# Patient Record
Sex: FEMALE | Race: WHITE | NOT HISPANIC OR LATINO | Employment: OTHER | URBAN - METROPOLITAN AREA
[De-identification: names, ages, dates, MRNs, and addresses within clinical notes are randomized per-mention and may not be internally consistent; named-entity substitution may affect disease eponyms.]

---

## 2021-05-27 ENCOUNTER — RECORDS - HEALTHEAST (OUTPATIENT)
Dept: ADMINISTRATIVE | Facility: CLINIC | Age: 56
End: 2021-05-27

## 2021-05-28 ENCOUNTER — RECORDS - HEALTHEAST (OUTPATIENT)
Dept: ADMINISTRATIVE | Facility: CLINIC | Age: 56
End: 2021-05-28

## 2021-05-30 ENCOUNTER — RECORDS - HEALTHEAST (OUTPATIENT)
Dept: ADMINISTRATIVE | Facility: CLINIC | Age: 56
End: 2021-05-30

## 2021-06-02 ENCOUNTER — RECORDS - HEALTHEAST (OUTPATIENT)
Dept: ADMINISTRATIVE | Facility: CLINIC | Age: 56
End: 2021-06-02

## 2021-06-26 ENCOUNTER — HEALTH MAINTENANCE LETTER (OUTPATIENT)
Age: 56
End: 2021-06-26

## 2021-10-16 ENCOUNTER — HEALTH MAINTENANCE LETTER (OUTPATIENT)
Age: 56
End: 2021-10-16

## 2022-07-17 ENCOUNTER — HEALTH MAINTENANCE LETTER (OUTPATIENT)
Age: 57
End: 2022-07-17

## 2022-09-25 ENCOUNTER — HEALTH MAINTENANCE LETTER (OUTPATIENT)
Age: 57
End: 2022-09-25

## 2023-08-05 ENCOUNTER — HEALTH MAINTENANCE LETTER (OUTPATIENT)
Age: 58
End: 2023-08-05

## 2024-02-19 ENCOUNTER — APPOINTMENT (OUTPATIENT)
Dept: CT IMAGING | Facility: CLINIC | Age: 59
End: 2024-02-19

## 2024-02-19 ENCOUNTER — APPOINTMENT (OUTPATIENT)
Dept: MRI IMAGING | Facility: CLINIC | Age: 59
End: 2024-02-19

## 2024-02-19 ENCOUNTER — HOSPITAL ENCOUNTER (EMERGENCY)
Facility: CLINIC | Age: 59
Discharge: HOME | End: 2024-02-19
Attending: EMERGENCY MEDICINE | Admitting: EMERGENCY MEDICINE

## 2024-02-19 ENCOUNTER — APPOINTMENT (OUTPATIENT)
Dept: RADIOLOGY | Facility: CLINIC | Age: 59
End: 2024-02-19
Attending: EMERGENCY MEDICINE

## 2024-02-19 ENCOUNTER — APPOINTMENT (OUTPATIENT)
Dept: RADIOLOGY | Facility: CLINIC | Age: 59
End: 2024-02-19

## 2024-02-19 VITALS
DIASTOLIC BLOOD PRESSURE: 99 MMHG | HEIGHT: 60 IN | TEMPERATURE: 98 F | SYSTOLIC BLOOD PRESSURE: 181 MMHG | OXYGEN SATURATION: 96 % | RESPIRATION RATE: 20 BRPM | HEART RATE: 88 BPM | WEIGHT: 130 LBS | BODY MASS INDEX: 25.52 KG/M2

## 2024-02-19 DIAGNOSIS — S42.213A: ICD-10-CM

## 2024-02-19 DIAGNOSIS — I10 UNCONTROLLED HYPERTENSION: ICD-10-CM

## 2024-02-19 LAB
CREAT SERPL-MCNC: 0.65 MG/DL (ref 0.51–0.95)
EGFRCR SERPLBLD CKD-EPI 2021: >90 ML/MIN/1.73M2
HOLD SPECIMEN: NORMAL

## 2024-02-19 PROCEDURE — 250N000013 HC RX MED GY IP 250 OP 250 PS 637

## 2024-02-19 PROCEDURE — 73060 X-RAY EXAM OF HUMERUS: CPT | Mod: LT

## 2024-02-19 PROCEDURE — 70450 CT HEAD/BRAIN W/O DYE: CPT

## 2024-02-19 PROCEDURE — 255N000002 HC RX 255 OP 636: Performed by: EMERGENCY MEDICINE

## 2024-02-19 PROCEDURE — A9585 GADOBUTROL INJECTION: HCPCS | Performed by: EMERGENCY MEDICINE

## 2024-02-19 PROCEDURE — 36415 COLL VENOUS BLD VENIPUNCTURE: CPT

## 2024-02-19 PROCEDURE — 73070 X-RAY EXAM OF ELBOW: CPT | Mod: LT

## 2024-02-19 PROCEDURE — 82565 ASSAY OF CREATININE: CPT

## 2024-02-19 PROCEDURE — 99285 EMERGENCY DEPT VISIT HI MDM: CPT | Mod: 25

## 2024-02-19 PROCEDURE — 70553 MRI BRAIN STEM W/O & W/DYE: CPT

## 2024-02-19 RX ORDER — ACETAMINOPHEN 325 MG/1
650 TABLET ORAL ONCE
Status: COMPLETED | OUTPATIENT
Start: 2024-02-19 | End: 2024-02-19

## 2024-02-19 RX ORDER — OXYCODONE HYDROCHLORIDE 5 MG/1
5 TABLET ORAL EVERY 6 HOURS PRN
Qty: 11 TABLET | Refills: 0 | Status: SHIPPED | OUTPATIENT
Start: 2024-02-19 | End: 2024-02-22

## 2024-02-19 RX ORDER — GADOBUTROL 604.72 MG/ML
0.1 INJECTION INTRAVENOUS ONCE
Status: COMPLETED | OUTPATIENT
Start: 2024-02-19 | End: 2024-02-19

## 2024-02-19 RX ADMIN — GADOBUTROL 5.9 ML: 604.72 INJECTION INTRAVENOUS at 20:49

## 2024-02-19 RX ADMIN — ACETAMINOPHEN 650 MG: 325 TABLET ORAL at 20:06

## 2024-02-19 ASSESSMENT — ACTIVITIES OF DAILY LIVING (ADL)
ADLS_ACUITY_SCORE: 35
ADLS_ACUITY_SCORE: 35

## 2024-02-19 NOTE — ED PROVIDER NOTES
EMERGENCY DEPARTMENT ENCOUNTER      NAME: Aurelia Mora  AGE: 59 year old female  YOB: 1965  MRN: [id number]  EVALUATION DATE & TIME: No admission date for patient encounter.    PCP: No primary care provider on file.    ED PROVIDER: Oneyda Acosta PA-C      Chief Complaint   Patient presents with    Arm Pain    Arm Injury     FINAL IMPRESSION:  1. Closed displaced fracture of surgical neck of humerus, unspecified fracture morphology, unspecified laterality, initial encounter    2. Uncontrolled hypertension      ED COURSE & MEDICAL DECISION MAKING:    Pertinent Labs & Imaging studies reviewed. (See chart for details)  59 year old female presents to the Emergency Department for evaluation of fall.  Just prior to arrival patient tripped over a cat bed and fell onto her left side.  Patient reports she did hit her head but did not lose consciousness.  No blood thinners on board.  Patient complains of left upper arm pain.  She denies headache, dizziness, lightheadedness, chest pain, shortness of breath, nausea, vomiting, sensory changes.  Vital signs reviewed patient is hypertensive most likely secondary to pain. Patient does have uncontrolled HTN.  Afebrile.  On exam cranial nerves II through XII intact.  GCS is 15.  Left upper mid arm is tender to palpation.  Left shoulder and remainder of the left upper extremity is nontender to palpation.  Pulses are 2+ bilaterally.  Decreased range of motion of the left upper arm secondary due to pain.  Normal sensation and strength of the left upper extremity.  Remainder of extremities are nontender with normal range of motion, sensation and strength.    Differential diagnosis includes intracranial hemorrhage, subdural hematoma, epidural hematoma, concussion, humerus fracture, muscle skeletal pain, dislocation.  X-ray of the humerus shows an acute mildly impacted and medial displaced fracture of the proximal humerus metaphysis involving the surgical neck.  Elbow  joint space appears normally aligned.  No definitive elbow joint effusion.  I spoke with Dr. Greenwood from [facility] who recommended placing the patient in a sling.  Dr. Greenwood will see the patient in clinic this week.  Patient received Tylenol for pain.  Patient reports that her pain is well-managed currently.  Patient is resting comfortably.  Head CT showed no acute traumatic intracranial abnormality.  No skull base fractures or clavicular.  Presumed chronic small vessel ischemic changes in the cerebral white matter.  Asymmetric 6 mm focus of hypodensity of the right thalamus representing age-indeterminate ischemia.  MRI was recommended.  MRI of the head shows no acute intercranial process.  Moderate to markedly extensive signal changes in the cerebral white matter and mild patchy signal changes in the kevin, nonspecific, but likely due to chronic small vessel ischemic disease.  Small chronic infarct in the right anterior thalamus.  Multiple nonspecific microhemorrhages presumably chronic primarily seen in the bilateral thalami with a few lesions also noted in the left cerebellar hemisphere and by lateral frontal lobes.  Predominantly pattern suggestive sequel of hypertension microangiopathy.  I reviewed the results with the patient and the patient's family.  Patient is educated on her imaging results.  Patient will follow-up with neurology and primary care doctor to discuss her MRI results and blood pressure.  Patient will follow-up with Dr. Greenwood from [facility] to discuss her fracture.  Patient will stay in her sling.  Patient will rest and ice left arm.  Strict return precautions were provided to the patient.  Patient will return to the ED if new symptoms develop or symptoms worsen.  Patient agrees with plan.  All questions answered.  Patient was discharged home with oxycodone.  Patient was educated on oxycodone and how to properly take it.  All questions answered.  Patient agrees with  plan.      ED COURSE:   5:15 PM I saw the patient.   6:53 PM I spoke with Herman Castillo   6:55 PM I staffed the patient with Dr. Resendiz   7:27 PM I updated the patient on the plan and she is agreeable   9:36 PM Rechecked and updated the patient. We discussed the plan for discharge and the patient is agreeable. Reviewed supportive cares, symptomatic treatment, outpatient follow up, and reasons to return to the Emergency Department. Patient to be discharged by ED RN.          At the conclusion of the encounter I discussed the results of all of the tests and the disposition. The questions were answered. The patient or family acknowledged understanding and was agreeable with the care plan.     0 minutes of critical care time       Medical Decision Making  Obtained supplemental history:Supplemental history obtained?: No  Reviewed external records: External records reviewed?: No  Care impacted by chronic illness:N/A  Care significantly affected by social determinants of health:N/A  Did you consider but not order tests?: Work up considered but not performed and documented in chart, if applicable  Did you interpret images independently?: Independent interpretation of ECG and images noted in documentation, when applicable.  Consultation discussion with other provider:Did you involve another provider (consultant, MH, pharmacy, etc.)?: I discussed the care with another health care provider, see documentation for details.  Discharge. I prescribed additional prescription strength medication(s) as charted. See documentation for any additional details.      MEDICATIONS GIVEN IN THE EMERGENCY:  Medications   acetaminophen (TYLENOL) tablet 650 mg (650 mg Oral $Given 2/19/24 2006)   gadobutrol (GADAVIST) injection 5.9 mL (5.9 mLs Intravenous $Given 2/19/24 2049)       NEW PRESCRIPTIONS STARTED AT TODAY'S ER VISIT  New Prescriptions    OXYCODONE (ROXICODONE) 5 MG TABLET    Take 1 tablet (5 mg) by mouth every 6 hours as  needed for pain          =================================================================    [facility]    Patient information was obtained from: Patient     Use of : N/A         Aurelia Mora is a 59 year old female with no pertinent history on file who presents to this ED by walk in for evaluation of a fall    Patient reports that she tripped and fall on a cat house PTA. She denies lightheadedness, dizziness, chest pain, or any symptoms prior to the fall. She is unsure exactly how she landed but she says she hit her head and immediatly started having left arm pain. She denies LOC. She needed assistance getting off the floor. Her arm is in a home made sling and she hasn't taken anything for the pain. She denies chest pain, shortness of breath, vision changes, nausea, vomiting. She is not anticoagulated.     REVIEW OF SYSTEMS   As per HPI    PAST MEDICAL HISTORY:  No past medical history on file.    PAST SURGICAL HISTORY:  No past surgical history on file.        CURRENT MEDICATIONS:    oxyCODONE (ROXICODONE) 5 MG tablet        ALLERGIES:  No Known Allergies    FAMILY HISTORY:  No family history on file.    SOCIAL HISTORY:   Social History     Socioeconomic History    Marital status: Single       VITALS:  BP (!) 181/99   Pulse 88   Temp 98  F (36.7  C) (Temporal)   Resp 20   Ht 1.524 m (5')   Wt 59 kg (130 lb)   SpO2 96%   BMI 25.39 kg/m      PHYSICAL EXAM    Physical Exam  Vitals and nursing note reviewed.   Constitutional:       Appearance: Normal appearance.   HENT:      Head: Atraumatic.      Right Ear: External ear normal.      Left Ear: External ear normal.      Nose: Nose normal.      Mouth/Throat:      Mouth: Mucous membranes are moist.   Eyes:      Conjunctiva/sclera: Conjunctivae normal.      Pupils: Pupils are equal, round, and reactive to light.   Cardiovascular:      Rate and Rhythm: Normal rate and regular rhythm.      Pulses: Normal pulses.      Heart sounds: Normal heart sounds. No  murmur heard.     No friction rub. No gallop.   Pulmonary:      Effort: Pulmonary effort is normal.      Breath sounds: Normal breath sounds. No wheezing or rales.   Abdominal:      Tenderness: There is no abdominal tenderness. There is no guarding or rebound.   Musculoskeletal:      Cervical back: Normal range of motion.      Comments: Scalp is nontender to palpation.  Facial bones are nontender to palpation.  Cervical, thoracic, lumbar, sacral spinous processes and paraspinal muscles are nontender to palpation.  Left humerus is tender to palpation.  Remainder of extremities are nontender to time.  Decreased range of motion of the left upper extremity secondary due to pain.  Left upper extremity has normal sensation and strength.  Pulses are 2+ bilaterally.  Remainder of normal range of motion, sensation and strength.   Skin:     General: Skin is dry.   Neurological:      General: No focal deficit present.      Mental Status: She is alert and oriented to person, place, and time. Mental status is at baseline.      GCS: GCS eye subscore is 4. GCS verbal subscore is 5. GCS motor subscore is 6.      Cranial Nerves: Cranial nerves 2-12 are intact. No cranial nerve deficit.      Sensory: Sensation is intact.      Motor: Motor function is intact.      Coordination: Coordination is intact.   Psychiatric:         Mood and Affect: Mood normal.         Thought Content: Thought content normal.          LAB:  All pertinent labs reviewed and interpreted.  Labs Ordered and Resulted from Time of ED Arrival to Time of ED Departure   CREATININE - Normal       Result Value    Creatinine 0.65      GFR Estimate >90          RADIOLOGY:  Reviewed all pertinent imaging. Please see official radiology report.  MR Brain w/o & w Contrast   Final Result   IMPRESSION:   1.  No acute intracranial process is identified.   2.  Moderate to markedly extensive signal changes in the cerebral white matter and mild patchy signal changes at the kevin,  nonspecific, but likely due to chronic small vessel ischemic disease.   3.  Small chronic infarct in the right anterior thalamus.   4.  Multiple nonspecific microhemorrhages (presumably chronic) primarily seen in the bilateral thalami with a few lesions also noted in the left cerebellar hemisphere and bilateral frontal lobes. The etiology is unclear, but the predominant pattern    suggests sequela of hypertensive microangiopathy.      XR Elbow Left 2 Views   Final Result   IMPRESSION: Acute mildly impacted and medially displaced fracture of the proximal humeral metaphysis involving the surgical neck. Elbow joint space appears normally aligned. No definite elbow joint effusion, however, evaluation is degraded due to    suboptimal positioning on the lateral view.      NOTE: ABNORMAL REPORT      THE DICTATION ABOVE DESCRIBES AN ABNORMALITY FOR WHICH FOLLOW-UP IS NEEDED.       XR Humerus Left G/E 2 Views   Final Result   IMPRESSION: Acute mildly impacted and medially displaced fracture of the proximal humeral metaphysis involving the surgical neck. Elbow joint space appears normally aligned. No definite elbow joint effusion, however, evaluation is degraded due to    suboptimal positioning on the lateral view.      NOTE: ABNORMAL REPORT      THE DICTATION ABOVE DESCRIBES AN ABNORMALITY FOR WHICH FOLLOW-UP IS NEEDED.       Head CT w/o contrast   Final Result   IMPRESSION:   1.  No acute traumatic intracranial abnormality.   2.  No calvarial or skull base fracture. Severe presumed chronic small vessel ischemic changes in the cerebral white matter.   3.  Asymmetric 6 mm focus of hypodensity at the right thalamus represents age-indeterminate ischemia. This could be further evaluated with MRI.               I, David Hinojosa, am serving as a scribe to document services personally performed by Oneyda Acosta PA-C, based on my observation and the provider's statements to me. I, Oneyda Acosta PA-C, attest that David  Micaela is acting in a scribe capacity, has observed my performance of the services and has documented them in accordance with my direction.    Oneyda Acosta PA-C  [facility] EMERGENCY ROOM  [address] [address] [phone]       Oneyda Acosta PA-C  02/19/24 4452

## 2024-02-19 NOTE — Clinical Note
Aurelia Mora was seen and treated in our emergency department on 2/19/2024.  She may return to work on 02/21/2024.       If you have any questions or concerns, please don't hesitate to call.      Oneyda Acosta, DULCE

## 2024-02-19 NOTE — ED TRIAGE NOTES
"Pt tripped over \"cat house\" at home landing on left arm and now has pain in her left humerus area. CSM intact.      Triage Assessment (Adult)       Row Name 02/19/24 4168          Triage Assessment    Airway WDL WDL        Respiratory WDL    Respiratory WDL WDL        Skin Circulation/Temperature WDL    Skin Circulation/Temperature WDL WDL        Cardiac WDL    Cardiac WDL WDL        Peripheral/Neurovascular WDL    Peripheral Neurovascular WDL WDL        Cognitive/Neuro/Behavioral WDL    Cognitive/Neuro/Behavioral WDL WDL                     "

## 2024-02-20 ENCOUNTER — TELEPHONE (OUTPATIENT)
Dept: NEUROLOGY | Facility: CLINIC | Age: 59
End: 2024-02-20

## 2024-02-20 NOTE — ED PROVIDER NOTES
I am seeing this patient along with Oneyda Acosta PA-C. I had a face to face encounter with this patient seen by the Advanced Practice Provider (GONZALO).  I have seen, examined, and discussed the patient with the GONZALO and agree with their assessment and plan of management. I personally saw the patient and performed a substantive portion of the visit including all aspects of the medical decision making.    HPI:  Patient reports that she tripped and fall on a cat house PTA.   ROS:  See HPI.  Physical Exam: Uncomfortable appearing patient with swelling left humeral area      LABS  Pertinent lab results reviewed in chart.  Labs Ordered and Resulted from Time of ED Arrival to Time of ED Departure   CREATININE - Normal       Result Value    Creatinine 0.65      GFR Estimate >90         EKG      RADIOLOGY  XR Elbow Left 2 Views   Final Result   IMPRESSION: Acute mildly impacted and medially displaced fracture of the proximal humeral metaphysis involving the surgical neck. Elbow joint space appears normally aligned. No definite elbow joint effusion, however, evaluation is degraded due to    suboptimal positioning on the lateral view.      NOTE: ABNORMAL REPORT      THE DICTATION ABOVE DESCRIBES AN ABNORMALITY FOR WHICH FOLLOW-UP IS NEEDED.       XR Humerus Left G/E 2 Views   Final Result   IMPRESSION: Acute mildly impacted and medially displaced fracture of the proximal humeral metaphysis involving the surgical neck. Elbow joint space appears normally aligned. No definite elbow joint effusion, however, evaluation is degraded due to    suboptimal positioning on the lateral view.      NOTE: ABNORMAL REPORT      THE DICTATION ABOVE DESCRIBES AN ABNORMALITY FOR WHICH FOLLOW-UP IS NEEDED.       Head CT w/o contrast   Final Result   IMPRESSION:   1.  No acute traumatic intracranial abnormality.   2.  No calvarial or skull base fracture. Severe presumed chronic small vessel ischemic changes in the cerebral white matter.   3.   Asymmetric 6 mm focus of hypodensity at the right thalamus represents age-indeterminate ischemia. This could be further evaluated with MRI.         MR Brain w/o & w Contrast    (Results Pending)       PROCEDURES       ED COURSE & MEDICAL DECISION MAKING    Pertinent Labs and Imagaing reviewed (see chart for details)    6:55 PM The patient was staffed with me by Oneyda Acosta PA-C.   I met with the patient, obtained history, performed physical exam, and discussed ED plan.    59 year old female here after a mechanical fall that resulted in what looks to be a humeral fracture.  X-ray confirms this.  Case discussed with orthopedics and she will be managed with pain control and immobilization and follow-up as an outpatient with them as this will likely be nonoperative.  Incidentally, family asked for head CT to rule out any injuries though there was an unknown head injury.  There was no intracranial hemorrhage but there was a possible incidental finding near the thalamus.  We did go ahead and get an MRI to delineate, but it looks to be that this is more of a chronic issue with microvascular changes likely related to hypertension and we will have her follow-up with her PCP about her blood pressure and this issue.  Patient to be discharged with pain control and return precautions.    At the conclusion of the encounter I discussed  the results of all of the tests and the disposition.   The questions were answered.  The patient or family acknowledged understanding and was agreeable with the care plan.       FINAL IMPRESSION      1. Closed displaced fracture of surgical neck of humerus, unspecified fracture morphology, unspecified laterality, initial encounter    2. Uncontrolled hypertension            CRITICAL CARE  0 Minutes      Quinn TAFOYA, am serving as a scribe to document services personally performed by Cleopatra Resendiz MD, based on my observation and the provider's statements to me. Astrid TAFOYA Shari Ann,  MD, attest that Quinn Varela is acting in a scribe capacity, has observed my performance of the services and has documented them in accordance with my direction.    Cleopatra Resendiz MD  2/19/2024 6:57 PM     Cleopatra Resendiz MD  02/19/24 6718

## 2024-02-20 NOTE — TELEPHONE ENCOUNTER
[facility] Call Center    Phone Message    May a detailed message be left on voicemail: yes     Reason for Call: Appointment Intake    Referring Provider Name: Oneyda Acosta PA-C  Diagnosis and/or Symptoms: Uncontrolled Hypertension    Dx is not listed in our protocols. Please review and contact the patient to discuss scheduling options.    Action Taken: Message routed to:  Other: [facility]    Travel Screening: Not Applicable

## 2024-02-20 NOTE — DISCHARGE INSTRUCTIONS
You have a humerus fracture.  Wear your sling.  Follow-up with Dr. Greenwood at [facility] soon as possible.  Return to the ED if new symptoms develop or symptoms worsen.  I prescribed oxycodone for your pain.  You can take 1 tablet of oxycodone every 6 hours as needed for pain.  Do not take more oxycodone than prescribed.  Do not operate heavy machinery or drive while taking oxycodone.  Your blood pressure has been elevated since being in the emergency room.  I recommend you follow-up with your primary care doctor to discuss your ED visit as well as your blood pressure.  I have referred you to neurology to follow-up on your MRI results.    Head CT:   IMPRESSION:  1.  No acute traumatic intracranial abnormality.  2.  No calvarial or skull base fracture. Severe presumed chronic small vessel ischemic changes in the cerebral white matter.  3.  Asymmetric 6 mm focus of hypodensity at the right thalamus represents age-indeterminate ischemia. This could be further evaluated with MRI.    MR Head:    IMPRESSION:  1.  No acute intracranial process is identified.  2.  Moderate to markedly extensive signal changes in the cerebral white matter and mild patchy signal changes at the kevin, nonspecific, but likely due to chronic small vessel ischemic disease.  3.  Small chronic infarct in the right anterior thalamus.  4.  Multiple nonspecific microhemorrhages (presumably chronic) primarily seen in the bilateral thalami with a few lesions also noted in the left cerebellar hemisphere and bilateral frontal lobes. The etiology is unclear, but the predominant pattern  suggests sequela of hypertensive microangiopathy.

## 2024-09-28 ENCOUNTER — HEALTH MAINTENANCE LETTER (OUTPATIENT)
Age: 59
End: 2024-09-28

## 2025-08-09 ENCOUNTER — HEALTH MAINTENANCE LETTER (OUTPATIENT)
Age: 60
End: 2025-08-09